# Patient Record
Sex: MALE | Race: BLACK OR AFRICAN AMERICAN | NOT HISPANIC OR LATINO | Employment: FULL TIME | URBAN - METROPOLITAN AREA
[De-identification: names, ages, dates, MRNs, and addresses within clinical notes are randomized per-mention and may not be internally consistent; named-entity substitution may affect disease eponyms.]

---

## 2017-01-13 ENCOUNTER — TELEPHONE (OUTPATIENT)
Dept: NEUROLOGY | Facility: HOSPITAL | Age: 57
End: 2017-01-13

## 2017-01-13 NOTE — TELEPHONE ENCOUNTER
----- Message from Xiomara Salinas sent at 1/12/2017 10:57 AM CST -----  JPB/New patient/YUNIEL - Who called: Patient                                     Referred by: Internet                Why do you need to be seen? Pancreatic Cancer - WANTS AAYUSH KNIFE               Which doctor are you requesting? JPCHAIM                                     You may contact patient back to schedule at: [phone]

## 2017-01-13 NOTE — TELEPHONE ENCOUNTER
Patient diagnosed with inoperable pancreatic cancer last January 2016 and had the fiducial markers placed.  Has had 2 nanokife procedures at [facility] in [place] with good response.  Was to get a third in December 2016 but insurance changed and has to  come to [facility].  Wants this done asap with Dr Georges.  Request[place] all records lab and [place], and [facility].  Waiting for email to get records together and will probably drop them off to us.

## 2017-01-13 NOTE — LETTER
January 13, 2017    Kasi Arce  [address] [place] [address]             [facility]  Tumor Program  [address]  [address]  [place] [address]  Phone: [phone]  Fax: [phone] Dear Mr. Arce:    Thank you for your interest in our program. It was my pleasure speaking with you today about your upcoming appointment.     You have a scheduled appointment with Dr TIANNA Georges on Friday, February 10, 2017 at 12:30 PM. Our office is located at :    [facility]  Medical Office Bl  Neuroendocrine Tumor Clinic  [address]  [place], [address]    You are scheduled for a consultation only with the physicians unless otherwise planned. Plan to be here for your visit for 2-3 hrs. If flight arrangements are made, plan to make the return flight after 6 pm if possible. The [place] airport ([facility]) is only 10 minutes from [facility].    In preparation for your appointment, we ask that you gather the information below and fax them to us ASAP. This will enable us to review all pertinent information at the time of your visit so that recommendations can be made and a plan of care developed for you.     Please return forms along with all paper records via fax asap.    Please fax  1. Insurance cards (front and back)-enlarged if possible  2. Drivers licenses  3. Current medicine list  4. Name, address & phone # of your physician for correspondence  5. Authorization for Release of Information-complete and return to clinic  6. Medical Records-send as soon as you have them together (see guidelines below)    Lab Work: If requested, the special lab markers do require special tubes that have to be ordered by the ordering facility. The lab work should be within 3 months.. The labs take 2-3 weeks to get results so please get labs drawn asap. The name and phone # of the send out lab that does the special labs tests is on the order. You can have the  labs drawn at [facility], [facility], a local hospital, or your doctors office (whichever works). If these lab tests need to be done, I will attach an Outpatient Order form. If you are doing your lab work at a facility other than [facility], call our office to notify us the date you have them drawn and the location and phone number of the lab for easy follow up.    Scans: Please mail copies of CD's of your last scans to the office asap. I would recommend sending them overnight with a tracking number in case of any problems. If you need updated scans, I will attach an Outpatient Order form.    Tissue Biopsy/Pathology: If you had a tissue biopsy or surgery, we will request to have your slides and/or tissue blocks sent to us to perform some special testing on them. Please provide us with a pathology report asa. This testing will be billed to your insurance company.     Operative or Procedure reports: All surgery or procedure reports related to your neuroendocrine tumor should be sent to us.    Insurance Company: You should contact your insurance company to inquire about your insurance coverage and benefits. Ask about co-payments and deductibles when seeing a specialist. Ask if this visit will be in Network or Out of Network. We may be able to work with you if this is out of network for you.    Lodging: Attached is lodging information from the [facility] and a list of local hotels. The [facility] is run by the [employer] and is free of charge. If you would [facility] to [facility], you must call my office and talk to [facility]. You will need to complete the application and send it to my office for a physician signature. We will forward it to the [place] [facility] and they will check availability. If you wish to stay at the [facility], apply EARLY, they fill up quickly. You may contact the [facility] a week later to confirm your reservation and ask any questions regarding the facility. You  May only stay at the [facility] 24 hrs prior to  your appointment and up to 24 hrs after your appointment. (THIS CAN ONLY BE USED IF YOU LIVE MORE THAN 40 MILES FROM OUR FACILITY).    Call me if you have any questions, email is not the best way to communicate with our office.    We are looking forward to meeting and taking care of you. If you have any questions or concerns, please don't hesitate to call.     Sincerely,        Orin Gonzalez, RN, BSN  Nurse Manager, Neuroendocrine Tumor Program

## 2017-01-17 ENCOUNTER — DOCUMENTATION ONLY (OUTPATIENT)
Dept: NEUROLOGY | Facility: HOSPITAL | Age: 57
End: 2017-01-17

## 2017-01-18 ENCOUNTER — TELEPHONE (OUTPATIENT)
Dept: NEUROLOGY | Facility: HOSPITAL | Age: 57
End: 2017-01-18

## 2017-01-18 NOTE — TELEPHONE ENCOUNTER
----- Message from Xiomara Salinas sent at 1/18/2017  9:19 AM CST -----  EAW/New patient - Who called: Addis from [facility]                                     Referred by: [facility]               Why do you need to be seen? Mass in [place]               Which doctor are you requesting? Jean-Claude                                     You may contact patient back to schedule at: [phone]    Instructed patient to gather medical records, Cd's and medication list and fax or [facility]

## 2017-01-18 NOTE — LETTER
January 18, 2017        Selwyn Whitt MD  [address] [place] [address] Blvd  [address]  [place] [address]             [facility] [address] [place] [address]  Phone: [phone]  Fax: [phone]   Patient: Kasi Arce   MR Number: [id number]   YOB: 1960   Date of Visit: 1/18/2017     Dear Dr. Whitt,     We contacted Mr. Arce regarding setting up an appointment for an evaluation at our center.  We scheduled an appointment with Dr. TIANNA Georges on 2/7/17 for recommendations.  We will forward you a copy of the clinic note after the visit.      Thank you for considering our program and for referring this patient.  If you have any questions, please do not hesitate to contact us.      Sincerely,      Orin Gonzalez RN

## 2017-02-01 ENCOUNTER — TELEPHONE (OUTPATIENT)
Dept: NEUROLOGY | Facility: HOSPITAL | Age: 57
End: 2017-02-01

## 2017-02-01 NOTE — TELEPHONE ENCOUNTER
----- Message from Hansa Fry sent at 2/1/2017  1:06 PM CST -----  Contact: [phone]  NITO- Patient is make sure we have everything he needs for his appointment next week. Please call back to assist.

## 2017-02-01 NOTE — TELEPHONE ENCOUNTER
Returned pts call. Pt inquiring if we received a nanoknife report from [facility] in [place]. Advised pt that we have not received such report. Pt will contact them to fax it to our office. Advised pt to have them address it to my attention so it will be received promptly. Pt verbalizes understanding.

## 2017-02-03 ENCOUNTER — TELEPHONE (OUTPATIENT)
Dept: NEUROLOGY | Facility: HOSPITAL | Age: 57
End: 2017-02-03

## 2017-02-03 NOTE — TELEPHONE ENCOUNTER
Received call from patient. Pt inquiring if we have received reports from [facility]. Advised pt that I have not yet received documents. Pt will have hospital refax. Will be on the lookout  For report.

## 2017-02-07 ENCOUNTER — OFFICE VISIT (OUTPATIENT)
Dept: NEUROLOGY | Facility: HOSPITAL | Age: 57
End: 2017-02-07
Attending: SURGERY
Payer: COMMERCIAL

## 2017-02-07 VITALS
HEART RATE: 80 BPM | HEIGHT: 72 IN | TEMPERATURE: 98 F | DIASTOLIC BLOOD PRESSURE: 72 MMHG | SYSTOLIC BLOOD PRESSURE: 111 MMHG | WEIGHT: 149 LBS | BODY MASS INDEX: 20.18 KG/M2

## 2017-02-07 DIAGNOSIS — C25.0 MALIGNANT NEOPLASM OF HEAD OF PANCREAS: Primary | ICD-10-CM

## 2017-02-07 PROCEDURE — 99214 OFFICE O/P EST MOD 30 MIN: CPT | Performed by: SURGERY

## 2017-02-07 NOTE — PROGRESS NOTES
[facility]OLAN[facility]:  [facility]  A collaboration between [facility] and [facility]      PATIENT: Kasi Arce  MRN: [id number]  DATE: 2/7/2017    Subjective:      Chief Complaint: Consult (nanoknife consult-pancreatic cancer s/p 2 nanoknife procedures done at [facility])      Vitals: There were no vitals filed for this visit.     Karnofsky Score:     Diagnosis:   1. Malignant neoplasm of head of pancreas         Oncologic History: AdenoCa pancreas diagnosed 1 yr ago.  Unresectable with SMA and SMV encasement.    Interval History: Has had chemotherapy and radiation, percutaneous Aisha knife treatment ×2 in [place], and is due a third treatment as well as relief of back pain with possible celiac plexus block.  Required to come here for aisha knife by insurance.    Past Medical History:  Past Medical History   Diagnosis Date    Diabetes mellitus type II     Hyperlipidemia     Primary pancreatic cancer 01/2016       Past Surgical History:  Past Surgical History   Procedure Laterality Date    Insertion of portacath      Exp lap with placement of fiducial markers pancreas      Nanoknife  9/2016, 10/2016     positive results    Tonsillectomy      Hernia repair       umbilical & right inguinal        Family History:  Family History   Problem Relation Age of Onset    Diabetes Mother     Kidney disease Mother     Cataracts Mother     Kidney disease Father     Cancer Neg Hx      neg for prostate or colon    Amblyopia Neg Hx     Blindness Neg Hx     Glaucoma Neg Hx     Hypertension Neg Hx     Macular degeneration Neg Hx     Retinal detachment Neg Hx     Strabismus Neg Hx     Stroke Neg Hx     Thyroid disease Neg Hx        Allergies:  Review of patient's allergies indicates:  No Known Allergies    Medications:  Current Outpatient Prescriptions   Medication Sig Dispense Refill    enalapril (VASOTEC) 2.5 MG tablet Take  2.5 mg by mouth once daily.       hydrocodone-acetaminophen 10-325mg (NORCO)  mg Tab Take 45 tablets by mouth every 4 (four) hours as needed. 45 tablet 0    metformin (GLUCOPHAGE) 850 MG tablet Take 850 mg by mouth 2 (two) times daily.       oxycodone (OXYCONTIN) 60 mg TR12 12 hr tablet Take 60 mg by mouth every 12 (twelve) hours.      oxycodone (ROXICODONE) 15 MG Tab Take 15 mg by mouth every 4 (four) hours as needed.       pravastatin (PRAVACHOL) 40 MG tablet Take 40 mg by mouth every evening.        Current Facility-Administered Medications   Medication Dose Route Frequency Provider Last Rate Last Dose    lidocaine (PF) 10 mg/ml (1%) injection 10 mg  1 mL Intradermal Once Selwyn Whitt MD           Review of Systems   Constitutional: Positive for activity change and unexpected weight change (weight stable for last 5 mos.  innitially lost >50 lbs.). Negative for appetite change, chills and fever. Fatigue: mild.   HENT: Negative.  Negative for congestion, hearing loss and sore throat.    Eyes: Negative.  Negative for pain, discharge and itching.   Respiratory: Negative.  Negative for cough, chest tightness, shortness of breath and wheezing.    Cardiovascular: Negative.  Negative for chest pain, palpitations and leg swelling.   Gastrointestinal: Positive for abdominal pain and constipation. Negative for abdominal distention, blood in stool, diarrhea, nausea and vomiting.   Endocrine: Negative.  Negative for cold intolerance, heat intolerance and polydipsia.   Genitourinary: Negative.  Negative for difficulty urinating, dysuria and flank pain.   Musculoskeletal: Positive for back pain. Negative for arthralgias, joint swelling and myalgias.   Skin: Negative.  Negative for color change, pallor and rash.   Allergic/Immunologic: Negative.    Neurological: Negative.  Negative for dizziness, syncope and light-headedness.   Hematological: Negative.  Negative for adenopathy. Does not bruise/bleed easily.    Psychiatric/Behavioral: Negative.  Negative for agitation, confusion, dysphoric mood, hallucinations, sleep disturbance and suicidal ideas. The patient is not nervous/anxious.    All other systems reviewed and are negative.     Objective:      Physical Exam   Constitutional: He is oriented to person, place, and time. He appears well-developed.   Thin     HENT:   Head: Normocephalic and atraumatic.   Mouth/Throat: Oropharynx is clear and moist. No oropharyngeal exudate.   Eyes: Conjunctivae and EOM are normal. Pupils are equal, round, and reactive to light. Right eye exhibits no discharge. Left eye exhibits no discharge. No scleral icterus.   Neck: Normal range of motion. Neck supple. No JVD present. No tracheal deviation present. No thyromegaly present.   Cardiovascular: Regular rhythm and intact distal pulses.  Exam reveals no gallop and no friction rub.    No murmur heard.  Pulmonary/Chest: Effort normal and breath sounds normal. No respiratory distress. He has no wheezes. He has no rales.   Abdominal: Soft. Bowel sounds are normal. He exhibits no distension and no mass. There is no tenderness. There is no rebound and no guarding. No hernia.   Well healed scars   Genitourinary: Penis normal.   Musculoskeletal: Normal range of motion. He exhibits no edema or tenderness.   Lymphadenopathy:        Head (right side): No submandibular adenopathy present.        Head (left side): No submandibular adenopathy present.     He has no cervical adenopathy.        Right cervical: No superficial cervical adenopathy present.       Left cervical: No superficial cervical adenopathy present.     He has no axillary adenopathy.        Right: No supraclavicular adenopathy present.        Left: No supraclavicular adenopathy present.   Neurological: He is alert and oriented to person, place, and time. He has normal reflexes. No cranial nerve deficit.   Skin: Skin is warm and dry. No rash noted. He is not diaphoretic. No erythema. No  pallor.   Psychiatric: He has a normal mood and affect. His behavior is normal. Thought content normal.      Assessment:       1. Malignant neoplasm of head of pancreas        Laboratory Data:  Neuroendocrine Labs Latest Ref Rng & Units 9/29/2016 9/27/2016 9/27/2016 1/25/2016 5/21/2013 5/21/2013   WBC 3.90 - 12.70 K/uL - 3.34(L) - - 3.66(L) -   HGB 14.0 - 18.0 g/dL - 10.3(L) - - 14.7 -   HCT 40.0 - 54.0 % - 31.9(L) - - 43.3 -   PLATLETS 150 - 350 K/uL - 200 - - 171 -   GLUCOSE 70 - 110 mg/dL - 108 - - 111(H) -   BUN 6 - 20 mg/dL - 10 - - 12 -   CREATININE 0.5 - 1.4 mg/dL - 0.9 - - 0.9 -    - 145 mmol/L - 140 - - 137 -   K 3.5 - 5.1 mmol/L - 5.0 - - 4.2 -   CHLORIDE 95 - 110 mmol/L - 104 - - 102 -   CO2 23 - 29 mmol/L - 31(H) - - 27 -   CALCIUM 8.7 - 10.5 mg/dL - 9.5 - - 9.1 -   PROTEIN, TOTAL 6.0 - 8.4 g/dL - - - - 6.6 -   ALBUMIN 3.5 - 5.2 g/dl - - - - 4.3 -   TOTAL BILIRUBIN 0.1 - 1.0 mg/dl - - - - 0.4 -   ALK PHOSPHATASE 55 - 135 U/L - - - - 73 -   SGOT (AST) 10 - 40 U/L - - - - 25 -   SGPT (ALT) 10 - 44 U/L - - - - 29 -   TRIGLYCERIDES 30 - 150 mg/dL - - - - 52 -   CHOLERSTEROL 120 - 199 mg/dL - - - - 134 -   HDL 40 - 75 mg/dL - - - - 50 -   LDL 63 - 159 mg/dL - - - - 73.6 -   24 HR CREATININE CLEARANCE 23 - 375 mg/dl - - - - 72 -   HEMOGLOBIN A1C 4.0 - 6.2 % - - - - 8.5(H) -   Weight - 162 lbs - 162 lbs 183 lbs - 200 lbs         Impression: Scans reviewed in case discussed with Dr. Arroyo.  Looks to be a candidate for percutaneous Jamia knife   Plan:       Dr. Arroyo will contact Dr. Jim Navas IR in [place]    will probably need current imaging PET/CT.   consult Dr. Arroyo for nanoknife ablation and celiac plexus block per IR.  Continue chemotherapy              TIANNA Georges MD, FACS  Professor of Surgery, [facility]  Neuroendocrine Surgery, Hepatic/Pancreatic & General Surgery  [address], [address]  [place]  [address]  ph. [phone]; [phone]  fax. [phone]

## 2017-02-07 NOTE — PATIENT INSTRUCTIONS
Try to get copies of CD's from [facility]. We will contact you to schedule Jamia-Knife after CD's are received.

## 2017-02-07 NOTE — PROGRESS NOTES
Images from Jamia-knife requested from [facility]. Elliot Ying, in Radiology, may take 7-10 days to process.

## 2017-02-07 NOTE — MR AVS SNAPSHOT
[facility]  [address] [place] [address]  Phone: [phone]  Fax: [phone]                  Kasi Arce   2017 1:30 PM   Office Visit    Description:  Male : 1960   Provider:  TIANNA Georges MD   Department:  [facility]           Reason for Visit     Consult           Diagnoses this Visit        Comments    Malignant neoplasm of head of pancreas    -  Primary            To Do List           Goals (5 Years of Data)     None      [facility]     [facility] Nurse Care Line -  Assistance  Registered nurses in the [facility] provide clinical advisement, health education, appointment booking, and other advisory services.  Call for this free service at [phone].             Medications           Message regarding Medications     Verify the changes and/or additions to your medication regime listed below are the same as discussed with your clinician today.  If any of these changes or additions are incorrect, please notify your healthcare provider.             Verify that the below list of medications is an accurate representation of the medications you are currently taking.  If none reported, the list may be blank. If incorrect, please contact your healthcare provider. Carry this list with you in case of emergency.           Current Medications     enalapril (VASOTEC) 2.5 MG tablet Take 2.5 mg by mouth once daily.     hydrocodone-acetaminophen 10-325mg (NORCO)  mg Tab Take 45 tablets by mouth every 4 (four) hours as needed.    metformin (GLUCOPHAGE) 850 MG tablet Take 850 mg by mouth 2 (two) times daily.     oxycodone (OXYCONTIN) 60 mg TR12 12 hr tablet Take 60 mg by mouth every 12 (twelve) hours.    oxycodone (ROXICODONE) 15 MG Tab Take 15 mg by mouth every 4 (four) hours as needed.     pravastatin (PRAVACHOL) 40 MG tablet Take 40 mg by mouth every evening.            Clinical Reference Information            Your Vitals Were     BP Pulse Temp Height Weight BMI    111/72 80 98.3 °F (36.8 °C) (Oral) 6' (1.829 m) 67.6 kg (149 lb) 20.21 kg/m2      Blood Pressure          Most Recent Value    BP  111/72      Allergies as of 2/7/2017     No Known Allergies      Immunizations Administered on Date of Encounter - 2/7/2017     None      [facility] Sign-Up     Activating your [facility] account is as easy as 1-2-3!     1) Visit [email or web address], select Sign Up Now, enter this activation code and your date of birth, then select Next.  [email or web address]  Expires: 3/24/2017  3:08 PM      2) Create a username and password to use when you visit [facility] in the future and select a security question in case you lose your password and select Next.    3) Enter your e-mail address and click Sign Up!    Additional Information  If you have questions, please e-mail [email or web address] or call [phone] to talk to our [facility] staff. Remember, [facility] is NOT to be used for urgent needs. For medical emergencies, dial 911.         Instructions    Try to get copies of CD's from [facility]. We will contact you to schedule Jamia-Knife after CD's are received.        Language Assistance Services     ATTENTION: Language assistance services are available, free of charge. Please call [phone].      ATENCIÓN: Si habla español, tiene a rodriguez disposición servicios gratuitos de asistencia lingüística. Llame al [phone].     [facility] Ý: N?u b?n nói Ti?ng Vi?t, có các d?ch v? h? tr? ngôn ng? mi?n phí dành cho b?n. G?i s? [phone].         [facility] complies with applicable Federal civil rights laws and does not discriminate on the basis of race, color, national origin, age, disability, or sex.

## 2017-02-17 ENCOUNTER — TELEPHONE (OUTPATIENT)
Dept: NEUROLOGY | Facility: HOSPITAL | Age: 57
End: 2017-02-17

## 2017-02-17 NOTE — TELEPHONE ENCOUNTER
----- Message from Xiomara Salinas sent at 2/17/2017 11:57 AM CST -----  JPB_ Patient called and wanted to know results of his scans so she can schedule the procedure with the Jamia-knife. Please call patient back at [phone]. Thanks

## 2017-02-17 NOTE — TELEPHONE ENCOUNTER
Spoke w/ pt. Informed pt that we have received his records and I have forwarded to Dr. Arroyo to review and coordinate aisha-knife. Awaiting response. Pt verbalizes understanding.

## 2017-02-22 ENCOUNTER — TELEPHONE (OUTPATIENT)
Dept: NEUROLOGY | Facility: HOSPITAL | Age: 57
End: 2017-02-22

## 2017-02-22 NOTE — TELEPHONE ENCOUNTER
Received call from pt inquiring status of procedure with Dr. Arroyo. Informed pt that I had spoken to Dr. Arroyo and he was reviewing his information and would let me know when he was ready to proceed. Pt verbalizes understanding.

## 2017-03-02 ENCOUNTER — TELEPHONE (OUTPATIENT)
Dept: NEUROLOGY | Facility: HOSPITAL | Age: 57
End: 2017-03-02

## 2017-03-02 NOTE — TELEPHONE ENCOUNTER
Noted    ----- Message from TIANNA Georges MD sent at 3/1/2017  2:57 PM CST -----  kk  ----- Message -----     From: Shon Arroyo MD     Sent: 2/24/2017  12:48 PM       To: Jennie Sampson LPN, TIANNA Georges MD    I spoke with his doctor, Dr. Navas in [place]. He wasn't aware of the fact that the pt's insurance was a problem. He would like to do a peer review with the insurance company and see if he can get the procedure approved over there as the pt is currently part of a clinical trial.     I spoke with the pt who will speak with Dr. Navas so that he can have the insurance information. If they can't get the insurance approval we will then go ahead with the procedure here.    Have a good weekend,    Shon  ----- Message -----     From: Jennie Sampson LPN     Sent: 2/17/2017   4:02 PM       To: Shon Arroyo MD, Jennie Sampson LPN, #    Hi Dr. Arroyo,   Dr. Georges had spoken to you about this pt needed another aisha-knife procedure and we were requesting his records from [facility] from his previous procedures. I believe all we needed is now scanned and loaded into epic. Can you please review and advise on when we can proceed with scheduling?    Thanks!  Jennie